# Patient Record
(demographics unavailable — no encounter records)

---

## 2025-06-18 NOTE — CONSULT LETTER
[Dear  ___] : Dear  [unfilled], [Please see my note below.] : Please see my note below. [Sincerely,] : Sincerely, [FreeTextEntry1] : This is an update on DAVID BLACK  who I saw in the office today for a follow up. This is continuing active treatment of an existing pt.  [FreeTextEntry3] : Dr Washington

## 2025-06-18 NOTE — HISTORY OF PRESENT ILLNESS
[FreeTextEntry1] : 12 yr old male seen on 10/24/22 with a 3 month hx of recurrent frontal HAs awakening him in the AM. No vomiting, vertigo, ataxia, dysarthria, syncope, visual sx or trauma. The HAs resolved within a few months, but they have now resumed again in the last 2-3 months. same pattern and symptoms. No recent trauma or infection. PMH s/p surgery at 3 months old for aortic coarctation, followed annually by cardiologists at [facility]. On no meds. NKA. Doing well in 6th grade. Walked and talked on time. FT C/S no cx. FMH -ve migraine or epilepsy., MRI brain and EEG were NL in 2022.

## 2025-06-18 NOTE — DISCUSSION/SUMMARY
[FreeTextEntry1] : Vascular pattern HAs with NL neurological examination and negative neurological workup in the past. RTO prn. Pt advised to keep well hydrated, get 9 hrs sleep, limit computer use, use OTC meds prn HA and keep HA diary. Note sent to Dr Hamlin(PCP). Total clinician time spent on 6/18/2025 is 33 minutes including preparing to see the patient, obtaining and/or reviewing and confirming history, performing a medically necessary and appropriate examination, counseling and educating the patient and/or family, documenting clinical information in the EHR and communicating and/or referring to other healthcare professionals.

## 2025-06-18 NOTE — PHYSICAL EXAM
[FreeTextEntry1] : Alert, NAD. No bruits. Neck FROM. Back NL. PERRL, EOMI, face symmetric, hearing intact, Vf's full. Tone, power, sensation, gait, DTRs NL. No nystagmus or tremor.